# Patient Record
Sex: FEMALE | Race: WHITE
[De-identification: names, ages, dates, MRNs, and addresses within clinical notes are randomized per-mention and may not be internally consistent; named-entity substitution may affect disease eponyms.]

---

## 2021-11-10 ENCOUNTER — HOSPITAL ENCOUNTER (INPATIENT)
Dept: HOSPITAL 12 - ER | Age: 66
LOS: 8 days | Discharge: NURSING, REHAB OR LONG-TERM CARE | DRG: 871 | End: 2021-11-18
Payer: MEDICARE

## 2021-11-10 VITALS — HEIGHT: 58 IN | BODY MASS INDEX: 23.72 KG/M2 | WEIGHT: 113 LBS

## 2021-11-10 DIAGNOSIS — E86.1: ICD-10-CM

## 2021-11-10 DIAGNOSIS — R74.01: ICD-10-CM

## 2021-11-10 DIAGNOSIS — F43.22: ICD-10-CM

## 2021-11-10 DIAGNOSIS — N17.0: ICD-10-CM

## 2021-11-10 DIAGNOSIS — E87.2: ICD-10-CM

## 2021-11-10 DIAGNOSIS — A04.72: ICD-10-CM

## 2021-11-10 DIAGNOSIS — E88.09: ICD-10-CM

## 2021-11-10 DIAGNOSIS — E86.0: ICD-10-CM

## 2021-11-10 DIAGNOSIS — K92.2: ICD-10-CM

## 2021-11-10 DIAGNOSIS — Z20.822: ICD-10-CM

## 2021-11-10 DIAGNOSIS — E43: ICD-10-CM

## 2021-11-10 DIAGNOSIS — A41.9: Primary | ICD-10-CM

## 2021-11-10 DIAGNOSIS — E83.51: ICD-10-CM

## 2021-11-10 DIAGNOSIS — E87.1: ICD-10-CM

## 2021-11-10 LAB
ALP SERPL-CCNC: 72 U/L (ref 50–136)
ALT SERPL W/O P-5'-P-CCNC: 69 U/L (ref 14–59)
AST SERPL-CCNC: 53 U/L (ref 15–37)
BILIRUB DIRECT SERPL-MCNC: 0.2 MG/DL (ref 0–0.2)
BILIRUB SERPL-MCNC: 0.9 MG/DL (ref 0.2–1)
BUN SERPL-MCNC: 81 MG/DL (ref 7–18)
CHLORIDE SERPL-SCNC: 93 MMOL/L (ref 98–107)
CO2 SERPL-SCNC: 19 MMOL/L (ref 21–32)
CREAT SERPL-MCNC: 4.4 MG/DL (ref 0.6–1.3)
GLUCOSE SERPL-MCNC: 113 MG/DL (ref 74–106)
HCT VFR BLD AUTO: 39.8 % (ref 31.2–41.9)
HEMOCCULT STL QL: POSITIVE
LIPASE SERPL-CCNC: 40 U/L (ref 73–393)
MCH RBC QN AUTO: 29.8 UUG (ref 24.7–32.8)
MCV RBC AUTO: 88.3 FL (ref 75.5–95.3)
PLATELET # BLD AUTO: 215 K/UL (ref 179–408)
POTASSIUM SERPL-SCNC: 4.6 MMOL/L (ref 3.5–5.1)
WS STN SPEC: 5.3 G/DL (ref 6.4–8.2)

## 2021-11-10 PROCEDURE — A4663 DIALYSIS BLOOD PRESSURE CUFF: HCPCS

## 2021-11-10 PROCEDURE — A4217 STERILE WATER/SALINE, 500 ML: HCPCS

## 2021-11-10 PROCEDURE — G0378 HOSPITAL OBSERVATION PER HR: HCPCS

## 2021-11-10 PROCEDURE — C9113 INJ PANTOPRAZOLE SODIUM, VIA: HCPCS

## 2021-11-10 RX ADMIN — METRONIDAZOLE SCH MLS/HR: 500 SOLUTION INTRAVENOUS at 23:15

## 2021-11-10 RX ADMIN — DEXTROSE SCH MG: 50 INJECTION, SOLUTION INTRAVENOUS at 23:15

## 2021-11-10 RX ADMIN — LACTOBACILLUS ACIDOPH-L.BULGARICUS 1 MILLION CELL CHEWABLE TABLET SCH TAB.CHEW: at 23:28

## 2021-11-10 NOTE — NUR
-------------------------------------------------------------------------------

           *** Note jhony in EDM - 11/10/21 at 1459 by KATE ***           

-------------------------------------------------------------------------------

PATIENT CAME INTO THE ED FROM HOME WITH A C/C OF NAUSEA AND VOMITING FOR THE 
PAST 2 DAYS. PATIENT HAS NO KNOWN MEDICAL HISTORY. PATIENT SKIN LOOKS DRY UPON 
ASSESSMENT. PATIENT IS IN GOWN, ON MONITORS, AAOX4, NO VOMITING SINCE BEING 
ADMITTED TO THE ED, BUT REPORTS NAUSEA. 20 G ESTABLISHED IN HER RIGHT WRIST AND 
LINE IS PATENT. PENDING MD ORDERS.

## 2021-11-10 NOTE — NUR
PATIENT DISCHARGED HOME WIHT LEG BAG, AND TO FOLLOW UP WITH UROLOGIST IN THE 
NEXT COUPLE OF DAYS AND TO RETURN TO THE ED IF THE PROBLEM PERSISTS. PATIENT IS 
AMBULATORY WITH A STEADY GAIT, AAOX4, DENIES SHORTNESS OF BREATH, NAD NOTED, 
DENIES N/V/D. IV DC'D, ARMBAND REMOVED. PT IS BEING ACCOMPANIED BY DAUGHTER TO 
GO HOME WITH BELONGINGS.

## 2021-11-11 LAB
ALP SERPL-CCNC: 57 U/L (ref 50–136)
ALT SERPL W/O P-5'-P-CCNC: 51 U/L (ref 14–59)
AST SERPL-CCNC: 36 U/L (ref 15–37)
BILIRUB DIRECT SERPL-MCNC: 0.2 MG/DL (ref 0–0.2)
BILIRUB SERPL-MCNC: 0.8 MG/DL (ref 0.2–1)
BUN SERPL-MCNC: 88 MG/DL (ref 7–18)
CHLORIDE SERPL-SCNC: 99 MMOL/L (ref 98–107)
CO2 SERPL-SCNC: 18 MMOL/L (ref 21–32)
CREAT SERPL-MCNC: 4.4 MG/DL (ref 0.6–1.3)
GLUCOSE SERPL-MCNC: 87 MG/DL (ref 74–106)
HCT VFR BLD AUTO: 40.9 % (ref 31.2–41.9)
MAGNESIUM SERPL-MCNC: 2.8 MG/DL (ref 1.8–2.4)
MCH RBC QN AUTO: 29.8 UUG (ref 24.7–32.8)
MCV RBC AUTO: 89.4 FL (ref 75.5–95.3)
PHOSPHATE SERPL-MCNC: 6 MG/DL (ref 2.5–4.9)
PLATELET # BLD AUTO: 197 K/UL (ref 179–408)
POTASSIUM SERPL-SCNC: 4.8 MMOL/L (ref 3.5–5.1)
WS STN SPEC: 5 G/DL (ref 6.4–8.2)

## 2021-11-11 PROCEDURE — 05HB33Z INSERTION OF INFUSION DEVICE INTO RIGHT BASILIC VEIN, PERCUTANEOUS APPROACH: ICD-10-PCS

## 2021-11-11 RX ADMIN — METRONIDAZOLE SCH MLS/HR: 500 SOLUTION INTRAVENOUS at 14:05

## 2021-11-11 RX ADMIN — DEXTROSE SCH MG: 50 INJECTION, SOLUTION INTRAVENOUS at 23:30

## 2021-11-11 RX ADMIN — VANCOMYCIN HYDROCHLORIDE SCH MG: 500 INJECTION, POWDER, LYOPHILIZED, FOR SOLUTION INTRAVENOUS at 18:33

## 2021-11-11 RX ADMIN — DEXTROSE SCH MG: 50 INJECTION, SOLUTION INTRAVENOUS at 09:00

## 2021-11-11 RX ADMIN — LACTOBACILLUS ACIDOPH-L.BULGARICUS 1 MILLION CELL CHEWABLE TABLET SCH TAB.CHEW: at 23:20

## 2021-11-11 RX ADMIN — LACTOBACILLUS ACIDOPH-L.BULGARICUS 1 MILLION CELL CHEWABLE TABLET SCH TAB.CHEW: at 14:04

## 2021-11-11 RX ADMIN — LACTOBACILLUS ACIDOPH-L.BULGARICUS 1 MILLION CELL CHEWABLE TABLET SCH TAB.CHEW: at 06:00

## 2021-11-11 RX ADMIN — CALCIUM SCH MG: 500 TABLET ORAL at 09:02

## 2021-11-11 RX ADMIN — VANCOMYCIN HYDROCHLORIDE SCH MG: 500 INJECTION, POWDER, LYOPHILIZED, FOR SOLUTION INTRAVENOUS at 12:14

## 2021-11-11 RX ADMIN — METRONIDAZOLE SCH MLS/HR: 500 SOLUTION INTRAVENOUS at 06:00

## 2021-11-11 RX ADMIN — METRONIDAZOLE SCH MLS/HR: 500 SOLUTION INTRAVENOUS at 23:30

## 2021-11-11 NOTE — NUR
pt remained comfortable in bed the whole er stay. perineal hygine provided 
multiple time, the last one at 1700 with loose yellow stool. c-diff precautions 
implemented.

## 2021-11-12 VITALS — DIASTOLIC BLOOD PRESSURE: 61 MMHG | SYSTOLIC BLOOD PRESSURE: 137 MMHG

## 2021-11-12 VITALS — SYSTOLIC BLOOD PRESSURE: 112 MMHG | DIASTOLIC BLOOD PRESSURE: 41 MMHG

## 2021-11-12 VITALS — DIASTOLIC BLOOD PRESSURE: 59 MMHG | SYSTOLIC BLOOD PRESSURE: 112 MMHG

## 2021-11-12 VITALS — SYSTOLIC BLOOD PRESSURE: 115 MMHG | DIASTOLIC BLOOD PRESSURE: 54 MMHG

## 2021-11-12 LAB
BUN SERPL-MCNC: 100 MG/DL (ref 7–18)
CHLORIDE SERPL-SCNC: 102 MMOL/L (ref 98–107)
CO2 SERPL-SCNC: 16 MMOL/L (ref 21–32)
CREAT SERPL-MCNC: 4.1 MG/DL (ref 0.6–1.3)
GLUCOSE SERPL-MCNC: 85 MG/DL (ref 74–106)
HCT VFR BLD AUTO: 37.1 % (ref 31.2–41.9)
MCH RBC QN AUTO: 29.5 UUG (ref 24.7–32.8)
MCV RBC AUTO: 89 FL (ref 75.5–95.3)
PLATELET # BLD AUTO: 177 K/UL (ref 179–408)
POTASSIUM SERPL-SCNC: 4.7 MMOL/L (ref 3.5–5.1)

## 2021-11-12 RX ADMIN — METRONIDAZOLE SCH MLS/HR: 500 SOLUTION INTRAVENOUS at 21:03

## 2021-11-12 RX ADMIN — CALCIUM SCH MG: 500 TABLET ORAL at 09:16

## 2021-11-12 RX ADMIN — METRONIDAZOLE SCH MLS/HR: 500 SOLUTION INTRAVENOUS at 13:24

## 2021-11-12 RX ADMIN — LACTOBACILLUS ACIDOPH-L.BULGARICUS 1 MILLION CELL CHEWABLE TABLET SCH TAB.CHEW: at 21:03

## 2021-11-12 RX ADMIN — VANCOMYCIN HYDROCHLORIDE SCH MG: 500 INJECTION, POWDER, LYOPHILIZED, FOR SOLUTION INTRAVENOUS at 23:22

## 2021-11-12 RX ADMIN — PANTOPRAZOLE SODIUM SCH MG: 40 GRANULE, DELAYED RELEASE ORAL at 20:53

## 2021-11-12 RX ADMIN — LACTOBACILLUS ACIDOPH-L.BULGARICUS 1 MILLION CELL CHEWABLE TABLET SCH TAB.CHEW: at 06:39

## 2021-11-12 RX ADMIN — LACTOBACILLUS ACIDOPH-L.BULGARICUS 1 MILLION CELL CHEWABLE TABLET SCH TAB.CHEW: at 13:07

## 2021-11-12 RX ADMIN — METRONIDAZOLE SCH MLS/HR: 500 SOLUTION INTRAVENOUS at 06:39

## 2021-11-12 RX ADMIN — DEXTROSE PRN MLS/HR: 5 SOLUTION INTRAVENOUS at 23:19

## 2021-11-12 RX ADMIN — DEXTROSE SCH MG: 50 INJECTION, SOLUTION INTRAVENOUS at 09:16

## 2021-11-12 RX ADMIN — VANCOMYCIN HYDROCHLORIDE SCH MG: 500 INJECTION, POWDER, LYOPHILIZED, FOR SOLUTION INTRAVENOUS at 00:37

## 2021-11-12 RX ADMIN — VANCOMYCIN HYDROCHLORIDE SCH MG: 500 INJECTION, POWDER, LYOPHILIZED, FOR SOLUTION INTRAVENOUS at 06:26

## 2021-11-12 RX ADMIN — VANCOMYCIN HYDROCHLORIDE SCH MG: 500 INJECTION, POWDER, LYOPHILIZED, FOR SOLUTION INTRAVENOUS at 17:39

## 2021-11-12 RX ADMIN — DEXTROSE PRN MLS/HR: 5 SOLUTION INTRAVENOUS at 13:06

## 2021-11-12 RX ADMIN — VANCOMYCIN HYDROCHLORIDE SCH MG: 500 INJECTION, POWDER, LYOPHILIZED, FOR SOLUTION INTRAVENOUS at 13:24

## 2021-11-12 NOTE — NUR
Received pt resting in bed. AAO x4. No acute distress noted. Denies pain/ discomfort. Denies 
N/V. Pt still drinking Golytely for her colonoscopy tomorrow. NPO except meds after 
midnight. IRAJ midline, patent and intact. Safety measures maintained. Call light and 
personal items within reach. Will continue to monitor.

## 2021-11-13 VITALS — SYSTOLIC BLOOD PRESSURE: 121 MMHG | DIASTOLIC BLOOD PRESSURE: 59 MMHG

## 2021-11-13 VITALS — SYSTOLIC BLOOD PRESSURE: 116 MMHG | DIASTOLIC BLOOD PRESSURE: 60 MMHG

## 2021-11-13 VITALS — SYSTOLIC BLOOD PRESSURE: 132 MMHG | DIASTOLIC BLOOD PRESSURE: 65 MMHG

## 2021-11-13 VITALS — DIASTOLIC BLOOD PRESSURE: 64 MMHG | SYSTOLIC BLOOD PRESSURE: 125 MMHG

## 2021-11-13 LAB
BUN SERPL-MCNC: 97 MG/DL (ref 7–18)
CHLORIDE SERPL-SCNC: 106 MMOL/L (ref 98–107)
CO2 SERPL-SCNC: 17 MMOL/L (ref 21–32)
CREAT SERPL-MCNC: 3.3 MG/DL (ref 0.6–1.3)
GLUCOSE SERPL-MCNC: 68 MG/DL (ref 74–106)
HCT VFR BLD AUTO: 37.6 % (ref 31.2–41.9)
MCH RBC QN AUTO: 29.8 UUG (ref 24.7–32.8)
MCV RBC AUTO: 89.1 FL (ref 75.5–95.3)
PLATELET # BLD AUTO: 143 K/UL (ref 179–408)
POTASSIUM SERPL-SCNC: 4.2 MMOL/L (ref 3.5–5.1)

## 2021-11-13 PROCEDURE — 0DJD8ZZ INSPECTION OF LOWER INTESTINAL TRACT, VIA NATURAL OR ARTIFICIAL OPENING ENDOSCOPIC: ICD-10-PCS

## 2021-11-13 RX ADMIN — VANCOMYCIN HYDROCHLORIDE SCH MG: 500 INJECTION, POWDER, LYOPHILIZED, FOR SOLUTION INTRAVENOUS at 05:16

## 2021-11-13 RX ADMIN — LACTOBACILLUS ACIDOPH-L.BULGARICUS 1 MILLION CELL CHEWABLE TABLET SCH TAB.CHEW: at 22:13

## 2021-11-13 RX ADMIN — METRONIDAZOLE SCH MLS/HR: 500 SOLUTION INTRAVENOUS at 05:16

## 2021-11-13 RX ADMIN — METRONIDAZOLE SCH MLS/HR: 500 SOLUTION INTRAVENOUS at 15:21

## 2021-11-13 RX ADMIN — DEXTROSE PRN MLS/HR: 5 SOLUTION INTRAVENOUS at 17:22

## 2021-11-13 RX ADMIN — METRONIDAZOLE SCH MLS/HR: 500 SOLUTION INTRAVENOUS at 22:11

## 2021-11-13 RX ADMIN — PANTOPRAZOLE SODIUM SCH MG: 40 GRANULE, DELAYED RELEASE ORAL at 20:08

## 2021-11-13 RX ADMIN — VANCOMYCIN HYDROCHLORIDE SCH MG: 500 INJECTION, POWDER, LYOPHILIZED, FOR SOLUTION INTRAVENOUS at 11:36

## 2021-11-13 RX ADMIN — LACTOBACILLUS ACIDOPH-L.BULGARICUS 1 MILLION CELL CHEWABLE TABLET SCH TAB.CHEW: at 15:22

## 2021-11-13 RX ADMIN — CALCIUM SCH MG: 500 TABLET ORAL at 09:11

## 2021-11-13 RX ADMIN — PANTOPRAZOLE SODIUM SCH MG: 40 GRANULE, DELAYED RELEASE ORAL at 09:11

## 2021-11-13 RX ADMIN — VANCOMYCIN HYDROCHLORIDE SCH MG: 500 INJECTION, POWDER, LYOPHILIZED, FOR SOLUTION INTRAVENOUS at 17:36

## 2021-11-13 RX ADMIN — LACTOBACILLUS ACIDOPH-L.BULGARICUS 1 MILLION CELL CHEWABLE TABLET SCH TAB.CHEW: at 05:16

## 2021-11-13 NOTE — NUR
Patient is alert and oriented x 4, denies of any pain. on NPO except meds and patient is 
compliant, patient refused to finish GOLYTELY and MD is aware. Consent for Colonoscopy has 
been signed. Picked up by OR nurse for colonoscopy. IRAJ midline intact.

## 2021-11-13 NOTE — NUR
Notified [facility] that pt unable to finish Golytely, and NP stated that it is okay not to finish. 
Pt to be NPO.

## 2021-11-13 NOTE — NUR
Patient back to the unit from colonoscopy procedure, no new order, patient will be on clear 
liquid diet per Kasie QUINONES and will continue to monitor. Family at bedside and was able to 
speak to Kasie Young NP.

## 2021-11-14 VITALS — SYSTOLIC BLOOD PRESSURE: 120 MMHG | DIASTOLIC BLOOD PRESSURE: 66 MMHG

## 2021-11-14 VITALS — SYSTOLIC BLOOD PRESSURE: 113 MMHG | DIASTOLIC BLOOD PRESSURE: 52 MMHG

## 2021-11-14 VITALS — SYSTOLIC BLOOD PRESSURE: 117 MMHG | DIASTOLIC BLOOD PRESSURE: 52 MMHG

## 2021-11-14 LAB
BUN SERPL-MCNC: 81 MG/DL (ref 7–18)
CHLORIDE SERPL-SCNC: 110 MMOL/L (ref 98–107)
CO2 SERPL-SCNC: 21 MMOL/L (ref 21–32)
CREAT SERPL-MCNC: 2.2 MG/DL (ref 0.6–1.3)
GLUCOSE SERPL-MCNC: 123 MG/DL (ref 74–106)
HCT VFR BLD AUTO: 39 % (ref 31.2–41.9)
LYMPHOCYTES NFR BLD MANUAL: 15 % (ref 20–40)
MCH RBC QN AUTO: 29.7 UUG (ref 24.7–32.8)
MCV RBC AUTO: 88 FL (ref 75.5–95.3)
MONOCYTES NFR BLD MANUAL: 5 % (ref 2–10)
MYELOCYTES NFR BLD MANUAL: 1 % (ref 0–0)
NEUTS BAND NFR BLD MANUAL: 7 % (ref 0–10)
NEUTS SEG NFR BLD MANUAL: 72 % (ref 42–75)
PLATELET # BLD AUTO: 112 K/UL (ref 179–408)
POTASSIUM SERPL-SCNC: 3.7 MMOL/L (ref 3.5–5.1)

## 2021-11-14 RX ADMIN — VANCOMYCIN HYDROCHLORIDE SCH MG: 500 INJECTION, POWDER, LYOPHILIZED, FOR SOLUTION INTRAVENOUS at 17:59

## 2021-11-14 RX ADMIN — DEXTROSE PRN MLS/HR: 5 SOLUTION INTRAVENOUS at 03:55

## 2021-11-14 RX ADMIN — METRONIDAZOLE SCH MG: 500 TABLET ORAL at 22:26

## 2021-11-14 RX ADMIN — DEXTROSE AND SODIUM CHLORIDE PRN MLS/HR: 5; .45 INJECTION, SOLUTION INTRAVENOUS at 22:54

## 2021-11-14 RX ADMIN — LACTOBACILLUS ACIDOPH-L.BULGARICUS 1 MILLION CELL CHEWABLE TABLET SCH TAB.CHEW: at 13:03

## 2021-11-14 RX ADMIN — CALCIUM SCH MG: 500 TABLET ORAL at 09:13

## 2021-11-14 RX ADMIN — LACTOBACILLUS ACIDOPH-L.BULGARICUS 1 MILLION CELL CHEWABLE TABLET SCH TAB.CHEW: at 05:00

## 2021-11-14 RX ADMIN — METRONIDAZOLE SCH MLS/HR: 500 SOLUTION INTRAVENOUS at 05:00

## 2021-11-14 RX ADMIN — VANCOMYCIN HYDROCHLORIDE SCH MG: 500 INJECTION, POWDER, LYOPHILIZED, FOR SOLUTION INTRAVENOUS at 00:12

## 2021-11-14 RX ADMIN — PANTOPRAZOLE SODIUM SCH MG: 40 GRANULE, DELAYED RELEASE ORAL at 09:13

## 2021-11-14 RX ADMIN — DEXTROSE AND SODIUM CHLORIDE PRN MLS/HR: 5; .45 INJECTION, SOLUTION INTRAVENOUS at 12:02

## 2021-11-14 RX ADMIN — LACTOBACILLUS ACIDOPH-L.BULGARICUS 1 MILLION CELL CHEWABLE TABLET SCH TAB.CHEW: at 22:26

## 2021-11-14 RX ADMIN — PANTOPRAZOLE SODIUM SCH MG: 40 GRANULE, DELAYED RELEASE ORAL at 20:50

## 2021-11-14 RX ADMIN — Medication SCH ML: at 17:59

## 2021-11-14 RX ADMIN — ALPRAZOLAM PRN MG: 0.25 TABLET ORAL at 20:50

## 2021-11-14 RX ADMIN — METRONIDAZOLE SCH MG: 500 TABLET ORAL at 13:03

## 2021-11-14 RX ADMIN — VANCOMYCIN HYDROCHLORIDE SCH MG: 500 INJECTION, POWDER, LYOPHILIZED, FOR SOLUTION INTRAVENOUS at 12:02

## 2021-11-14 RX ADMIN — VANCOMYCIN HYDROCHLORIDE SCH MG: 500 INJECTION, POWDER, LYOPHILIZED, FOR SOLUTION INTRAVENOUS at 05:00

## 2021-11-14 NOTE — NUR
pt is a little anxious about her condition and wants to talk to psychiatrist. informed np 
keh with order for psych consult and xanax 0.25mg q6 prn noted and carried out. patient made 
aware.

## 2021-11-14 NOTE — NUR
RECEIVED PT AWAKE, ALERT AND ORIENTEDX4. PT IN NO ACUTE DISTRESS. IV INTACT. SAFETY AND 
COMFORT PROVIDED. WILL CONTINUE TO MONITOR. PT FAMILY AT BEDSIDE. TALKED WITH FAMILY MEMBER 
REGARDING UPDATE WITH THEIR MOM.

## 2021-11-14 NOTE — NUR
pt awake in bed, family visiting at this time. no resp distress. denies pain or sob. 
provided calm and quiet environment. needs attended to. on flagyl and voncocin po. no 
adverse/allergic rxn noted. denies nausea or vomiting. fc intact draining yellow urine. 
safety measures in place. needs attended to. cont to monitor.

## 2021-11-14 NOTE — NUR
PT GIVEN XANAX AT 2050H FOR ANXIETY AND PT REQUESTED FOR IT. AFTER AN HOUR PT STATED SHE 
FELT BETTER. PT STABLE. WILL CONTINUE TO MONITOR.

## 2021-11-15 VITALS — SYSTOLIC BLOOD PRESSURE: 110 MMHG | DIASTOLIC BLOOD PRESSURE: 47 MMHG

## 2021-11-15 VITALS — DIASTOLIC BLOOD PRESSURE: 69 MMHG | SYSTOLIC BLOOD PRESSURE: 118 MMHG

## 2021-11-15 VITALS — DIASTOLIC BLOOD PRESSURE: 63 MMHG | SYSTOLIC BLOOD PRESSURE: 106 MMHG

## 2021-11-15 VITALS — SYSTOLIC BLOOD PRESSURE: 114 MMHG | DIASTOLIC BLOOD PRESSURE: 61 MMHG

## 2021-11-15 LAB
BUN SERPL-MCNC: 54 MG/DL (ref 7–18)
CHLORIDE SERPL-SCNC: 106 MMOL/L (ref 98–107)
CO2 SERPL-SCNC: 22 MMOL/L (ref 21–32)
CREAT SERPL-MCNC: 1.7 MG/DL (ref 0.6–1.3)
GLUCOSE SERPL-MCNC: 174 MG/DL (ref 74–106)
HCT VFR BLD AUTO: 41.3 % (ref 31.2–41.9)
MCH RBC QN AUTO: 29.2 UUG (ref 24.7–32.8)
MCV RBC AUTO: 86.9 FL (ref 75.5–95.3)
PLATELET # BLD AUTO: 99 K/UL (ref 179–408)
POTASSIUM SERPL-SCNC: 3.3 MMOL/L (ref 3.5–5.1)

## 2021-11-15 RX ADMIN — VANCOMYCIN HYDROCHLORIDE SCH MG: 500 INJECTION, POWDER, LYOPHILIZED, FOR SOLUTION INTRAVENOUS at 01:01

## 2021-11-15 RX ADMIN — DEXTROSE AND SODIUM CHLORIDE PRN MLS/HR: 5; .45 INJECTION, SOLUTION INTRAVENOUS at 10:12

## 2021-11-15 RX ADMIN — Medication SCH ML: at 10:47

## 2021-11-15 RX ADMIN — Medication PRN MG: at 20:25

## 2021-11-15 RX ADMIN — LACTOBACILLUS ACIDOPH-L.BULGARICUS 1 MILLION CELL CHEWABLE TABLET SCH TAB.CHEW: at 21:15

## 2021-11-15 RX ADMIN — VANCOMYCIN HYDROCHLORIDE SCH MG: 500 INJECTION, POWDER, LYOPHILIZED, FOR SOLUTION INTRAVENOUS at 13:00

## 2021-11-15 RX ADMIN — METRONIDAZOLE SCH MG: 500 TABLET ORAL at 05:37

## 2021-11-15 RX ADMIN — PANTOPRAZOLE SODIUM SCH MG: 40 GRANULE, DELAYED RELEASE ORAL at 10:46

## 2021-11-15 RX ADMIN — VANCOMYCIN HYDROCHLORIDE SCH MG: 500 INJECTION, POWDER, LYOPHILIZED, FOR SOLUTION INTRAVENOUS at 05:39

## 2021-11-15 RX ADMIN — METRONIDAZOLE SCH MG: 500 TABLET ORAL at 21:15

## 2021-11-15 RX ADMIN — Medication SCH ML: at 17:05

## 2021-11-15 RX ADMIN — VANCOMYCIN HYDROCHLORIDE SCH MG: 500 INJECTION, POWDER, LYOPHILIZED, FOR SOLUTION INTRAVENOUS at 23:09

## 2021-11-15 RX ADMIN — PANTOPRAZOLE SODIUM SCH MG: 40 GRANULE, DELAYED RELEASE ORAL at 20:54

## 2021-11-15 RX ADMIN — ALPRAZOLAM PRN MG: 0.25 TABLET ORAL at 23:08

## 2021-11-15 RX ADMIN — FIDAXOMICIN SCH MG: 200 TABLET, FILM COATED ORAL at 21:15

## 2021-11-15 RX ADMIN — LACTOBACILLUS ACIDOPH-L.BULGARICUS 1 MILLION CELL CHEWABLE TABLET SCH TAB.CHEW: at 05:37

## 2021-11-15 RX ADMIN — ACETAMINOPHEN PRN MG: 325 TABLET ORAL at 05:37

## 2021-11-15 RX ADMIN — Medication PRN MG: at 08:44

## 2021-11-15 RX ADMIN — LACTOBACILLUS ACIDOPH-L.BULGARICUS 1 MILLION CELL CHEWABLE TABLET SCH TAB.CHEW: at 13:01

## 2021-11-15 RX ADMIN — METRONIDAZOLE SCH MG: 500 TABLET ORAL at 13:00

## 2021-11-15 RX ADMIN — CALCIUM SCH MG: 500 TABLET ORAL at 10:46

## 2021-11-15 RX ADMIN — ACETAMINOPHEN PRN MG: 325 TABLET ORAL at 20:25

## 2021-11-15 RX ADMIN — VANCOMYCIN HYDROCHLORIDE SCH MG: 500 INJECTION, POWDER, LYOPHILIZED, FOR SOLUTION INTRAVENOUS at 17:05

## 2021-11-15 NOTE — NUR
received awake watching tv. no resp distress. denies pain or sob. moore intact draining 
yellow urine. safety measures in place. cont to monitor.

## 2021-11-15 NOTE — NUR
resting in bed watching tv. had a good visit with friend this afternoon. denies pain or sob. 
no acute distress. safety measures kept. cont to monitor. Electrodesiccation And Curettage Text: The wound bed was treated with electrodesiccation and curettage after the biopsy was performed.

## 2021-11-15 NOTE — NUR
PT SLEPT INTERMITTENTLTY. PT IN NO ACUTE DISTRESS. PRESCRIBED MEDICATION GIVEN AND PT 
TOLERATED IT WELL.  PT GIVEN TYLENOL PRN AT 0537H. IV INTACT. SAFETY AND COMFORT PROVIDED. 
WILL ENDORSE TO INCOMING NURSE FOR CONTINUITY OF CARE.

## 2021-11-16 VITALS — SYSTOLIC BLOOD PRESSURE: 116 MMHG | DIASTOLIC BLOOD PRESSURE: 60 MMHG

## 2021-11-16 VITALS — DIASTOLIC BLOOD PRESSURE: 62 MMHG | SYSTOLIC BLOOD PRESSURE: 119 MMHG

## 2021-11-16 VITALS — DIASTOLIC BLOOD PRESSURE: 49 MMHG | SYSTOLIC BLOOD PRESSURE: 109 MMHG

## 2021-11-16 VITALS — SYSTOLIC BLOOD PRESSURE: 115 MMHG | DIASTOLIC BLOOD PRESSURE: 50 MMHG

## 2021-11-16 LAB
BUN SERPL-MCNC: 39 MG/DL (ref 7–18)
CHLORIDE SERPL-SCNC: 105 MMOL/L (ref 98–107)
CO2 SERPL-SCNC: 25 MMOL/L (ref 21–32)
CREAT SERPL-MCNC: 1.4 MG/DL (ref 0.6–1.3)
GLUCOSE SERPL-MCNC: 114 MG/DL (ref 74–106)
HCT VFR BLD AUTO: 43.5 % (ref 31.2–41.9)
MCH RBC QN AUTO: 29.7 UUG (ref 24.7–32.8)
MCV RBC AUTO: 87.8 FL (ref 75.5–95.3)
PLATELET # BLD AUTO: 124 K/UL (ref 179–408)
POTASSIUM SERPL-SCNC: 4.1 MMOL/L (ref 3.5–5.1)

## 2021-11-16 RX ADMIN — Medication SCH ML: at 11:30

## 2021-11-16 RX ADMIN — LACTOBACILLUS ACIDOPH-L.BULGARICUS 1 MILLION CELL CHEWABLE TABLET SCH TAB.CHEW: at 06:04

## 2021-11-16 RX ADMIN — FIDAXOMICIN SCH MG: 200 TABLET, FILM COATED ORAL at 10:13

## 2021-11-16 RX ADMIN — LACTOBACILLUS ACIDOPH-L.BULGARICUS 1 MILLION CELL CHEWABLE TABLET SCH TAB.CHEW: at 21:39

## 2021-11-16 RX ADMIN — VANCOMYCIN HYDROCHLORIDE SCH MG: 500 INJECTION, POWDER, LYOPHILIZED, FOR SOLUTION INTRAVENOUS at 12:39

## 2021-11-16 RX ADMIN — PANTOPRAZOLE SODIUM SCH MG: 40 GRANULE, DELAYED RELEASE ORAL at 20:23

## 2021-11-16 RX ADMIN — CALCIUM SCH MG: 500 TABLET ORAL at 10:13

## 2021-11-16 RX ADMIN — LACTOBACILLUS ACIDOPH-L.BULGARICUS 1 MILLION CELL CHEWABLE TABLET SCH TAB.CHEW: at 13:09

## 2021-11-16 RX ADMIN — DEXTROSE AND SODIUM CHLORIDE PRN MLS/HR: 5; .45 INJECTION, SOLUTION INTRAVENOUS at 21:38

## 2021-11-16 RX ADMIN — PANTOPRAZOLE SODIUM SCH MG: 40 GRANULE, DELAYED RELEASE ORAL at 10:37

## 2021-11-16 RX ADMIN — METRONIDAZOLE SCH MG: 500 TABLET ORAL at 06:04

## 2021-11-16 RX ADMIN — Medication SCH ML: at 17:34

## 2021-11-16 RX ADMIN — ALPRAZOLAM PRN MG: 0.25 TABLET ORAL at 22:01

## 2021-11-16 RX ADMIN — METRONIDAZOLE SCH MG: 500 TABLET ORAL at 13:09

## 2021-11-16 RX ADMIN — VANCOMYCIN HYDROCHLORIDE SCH MG: 500 INJECTION, POWDER, LYOPHILIZED, FOR SOLUTION INTRAVENOUS at 06:04

## 2021-11-16 RX ADMIN — METRONIDAZOLE SCH MG: 500 TABLET ORAL at 21:39

## 2021-11-16 RX ADMIN — DEXTROSE AND SODIUM CHLORIDE PRN MLS/HR: 5; .45 INJECTION, SOLUTION INTRAVENOUS at 11:51

## 2021-11-16 RX ADMIN — OYSTER SHELL CALCIUM WITH VITAMIN D SCH UDTAB: 500; 200 TABLET, FILM COATED ORAL at 10:13

## 2021-11-16 RX ADMIN — VANCOMYCIN HYDROCHLORIDE SCH MG: 500 INJECTION, POWDER, LYOPHILIZED, FOR SOLUTION INTRAVENOUS at 17:33

## 2021-11-16 RX ADMIN — FIDAXOMICIN SCH MG: 200 TABLET, FILM COATED ORAL at 20:23

## 2021-11-16 NOTE — NUR
Received patient lying in bed watching tv. AOX4. No acute distress noted at this time. IV 
midline on Right UA running D5 1/2 NS running at 100cc/hr. Walter catheter draining yellow 
colored urine. All due medications given and tolerated well. Safety and comfort measures 
initiated. Bed alarm activated, call light button and bedside table within reach. Will 
continue to monitor.

## 2021-11-16 NOTE — NUR
REMAIN ON CONTACT ISOLATION AND PRECAUTION HAS MULTIPLE DIARRHEA EPISODES ASSISTED WITH 
CLEAN WAS ABLE TO AMB ULATE WITH THE PHYSICAL THERAPIST WITH [facility] IVF REMAINS IN 
PROGRESS AS ORDERED VIA MIDLINE RIGH5T UPPER ARM WITH NO S/S OF IN FILTRATION AT THIS TIME

## 2021-11-17 VITALS — DIASTOLIC BLOOD PRESSURE: 60 MMHG | SYSTOLIC BLOOD PRESSURE: 113 MMHG

## 2021-11-17 VITALS — DIASTOLIC BLOOD PRESSURE: 59 MMHG | SYSTOLIC BLOOD PRESSURE: 117 MMHG

## 2021-11-17 VITALS — SYSTOLIC BLOOD PRESSURE: 107 MMHG | DIASTOLIC BLOOD PRESSURE: 68 MMHG

## 2021-11-17 VITALS — SYSTOLIC BLOOD PRESSURE: 115 MMHG | DIASTOLIC BLOOD PRESSURE: 73 MMHG

## 2021-11-17 LAB
BUN SERPL-MCNC: 27 MG/DL (ref 7–18)
CHLORIDE SERPL-SCNC: 103 MMOL/L (ref 98–107)
CO2 SERPL-SCNC: 25 MMOL/L (ref 21–32)
CREAT SERPL-MCNC: 1.3 MG/DL (ref 0.6–1.3)
GLUCOSE SERPL-MCNC: 126 MG/DL (ref 74–106)
HCT VFR BLD AUTO: 41.3 % (ref 31.2–41.9)
MAGNESIUM SERPL-MCNC: 1.6 MG/DL (ref 1.8–2.4)
MCH RBC QN AUTO: 29.6 UUG (ref 24.7–32.8)
MCV RBC AUTO: 88.4 FL (ref 75.5–95.3)
PLATELET # BLD AUTO: 161 K/UL (ref 179–408)
POTASSIUM SERPL-SCNC: 3.9 MMOL/L (ref 3.5–5.1)

## 2021-11-17 RX ADMIN — PANTOPRAZOLE SODIUM SCH MG: 40 GRANULE, DELAYED RELEASE ORAL at 20:43

## 2021-11-17 RX ADMIN — MAGNESIUM SULFATE IN DEXTROSE SCH MLS/HR: 10 INJECTION, SOLUTION INTRAVENOUS at 10:31

## 2021-11-17 RX ADMIN — LACTOBACILLUS ACIDOPH-L.BULGARICUS 1 MILLION CELL CHEWABLE TABLET SCH TAB.CHEW: at 05:01

## 2021-11-17 RX ADMIN — Medication SCH ML: at 13:21

## 2021-11-17 RX ADMIN — PANTOPRAZOLE SODIUM SCH MG: 40 GRANULE, DELAYED RELEASE ORAL at 09:20

## 2021-11-17 RX ADMIN — LACTOBACILLUS ACIDOPH-L.BULGARICUS 1 MILLION CELL CHEWABLE TABLET SCH TAB.CHEW: at 21:44

## 2021-11-17 RX ADMIN — VANCOMYCIN HYDROCHLORIDE SCH MG: 500 INJECTION, POWDER, LYOPHILIZED, FOR SOLUTION INTRAVENOUS at 17:29

## 2021-11-17 RX ADMIN — LACTOBACILLUS ACIDOPH-L.BULGARICUS 1 MILLION CELL CHEWABLE TABLET SCH TAB.CHEW: at 13:21

## 2021-11-17 RX ADMIN — VANCOMYCIN HYDROCHLORIDE SCH MG: 500 INJECTION, POWDER, LYOPHILIZED, FOR SOLUTION INTRAVENOUS at 23:49

## 2021-11-17 RX ADMIN — OYSTER SHELL CALCIUM WITH VITAMIN D SCH UDTAB: 500; 200 TABLET, FILM COATED ORAL at 09:18

## 2021-11-17 RX ADMIN — Medication SCH ML: at 09:23

## 2021-11-17 RX ADMIN — Medication SCH ML: at 17:38

## 2021-11-17 RX ADMIN — Medication ONE ML: at 17:00

## 2021-11-17 RX ADMIN — VANCOMYCIN HYDROCHLORIDE SCH MG: 500 INJECTION, POWDER, LYOPHILIZED, FOR SOLUTION INTRAVENOUS at 00:10

## 2021-11-17 RX ADMIN — CALCIUM SCH MG: 500 TABLET ORAL at 09:18

## 2021-11-17 RX ADMIN — VANCOMYCIN HYDROCHLORIDE SCH MG: 500 INJECTION, POWDER, LYOPHILIZED, FOR SOLUTION INTRAVENOUS at 05:01

## 2021-11-17 RX ADMIN — METRONIDAZOLE SCH MG: 500 TABLET ORAL at 05:01

## 2021-11-17 RX ADMIN — METRONIDAZOLE SCH MG: 500 TABLET ORAL at 21:44

## 2021-11-17 RX ADMIN — METRONIDAZOLE SCH MG: 500 TABLET ORAL at 13:21

## 2021-11-17 RX ADMIN — VANCOMYCIN HYDROCHLORIDE SCH MG: 500 INJECTION, POWDER, LYOPHILIZED, FOR SOLUTION INTRAVENOUS at 12:39

## 2021-11-17 RX ADMIN — MAGNESIUM SULFATE IN DEXTROSE SCH MLS/HR: 10 INJECTION, SOLUTION INTRAVENOUS at 12:39

## 2021-11-17 RX ADMIN — Medication ONE ML: at 18:18

## 2021-11-17 RX ADMIN — FIDAXOMICIN SCH MG: 200 TABLET, FILM COATED ORAL at 09:50

## 2021-11-17 RX ADMIN — ALPRAZOLAM PRN MG: 0.25 TABLET ORAL at 22:20

## 2021-11-17 RX ADMIN — FIDAXOMICIN SCH MG: 200 TABLET, FILM COATED ORAL at 20:43

## 2021-11-17 RX ADMIN — DEXTROSE AND SODIUM CHLORIDE PRN MLS/HR: 5; .45 INJECTION, SOLUTION INTRAVENOUS at 07:28

## 2021-11-17 NOTE — NUR
OFFERED PATIENT DYCICLOMINE AS ORDERED FOR ABDOMINAL SPASMS A PRN ORDER STATED DOES NOT NEED 
IT NOW WILL LET ME KNOW WHEN SHE NEEDS IT

## 2021-11-17 NOTE — NUR
MAGNESSIUM LEVEL IS 1.5 SEEN BY DANI REICH NP WITH ORDERS AND NOTED DANI REICH ALSO WANTS 
PATIENT MEEKS CATH DISCONTINUED AND NOTED.

## 2021-11-17 NOTE — NUR
PATIENT HAS AN ORDER FOR DISCHARGE TODAY BUT PATIENT AND HER DAUGHTER IS NOT AGREEING TO BE 
DISCHARGED TODAY AND THEY SPOKE WITH DANI REICH AND THE PLAN IS THAT THE FAMILY WILL GO AND 
LOOK AT [facility] AND WILL THEN MAKE A DECISION WEATHER PATIENT WILL BE GOING 
HOME OR TO SNF IN AM.

## 2021-11-17 NOTE — NUR
Patient slept intermittently through the night. Patient reports abdominal pain, however, 
refuses to request for pain medication and resorted to non-pharmacological techniques 
instead.  IV on right U.A still running D5 1/2 NS at 100cc/hr. Walter catheter draining 
yellow colored urine. All due medications were given and tolerated well. Patient transferred 
to Room 320. Will endorse to incoming nurse.

## 2021-11-17 NOTE — NUR
SHE IS EATING SOUP AND FOOD BROUGHT IN BY HER FAMILY AT THE BEDSIDE WITH NO NAUSEA OR 
VOMITING AT THIS TIME.

## 2021-11-17 NOTE — NUR
CALL RECEIVED FROM  MICHEL STATED THAT PATIENTS FAMILY HAS AGREED FOR PATIENT TO 
BE DISCHARGED TO [facility] TOMORROW WILL BE PICKED UP ABOUT 1100 AM BY 
[facility]

## 2021-11-18 VITALS — DIASTOLIC BLOOD PRESSURE: 73 MMHG | SYSTOLIC BLOOD PRESSURE: 115 MMHG

## 2021-11-18 VITALS — SYSTOLIC BLOOD PRESSURE: 121 MMHG | DIASTOLIC BLOOD PRESSURE: 75 MMHG

## 2021-11-18 LAB
BUN SERPL-MCNC: 22 MG/DL (ref 7–18)
CHLORIDE SERPL-SCNC: 102 MMOL/L (ref 98–107)
CO2 SERPL-SCNC: 28 MMOL/L (ref 21–32)
CREAT SERPL-MCNC: 1.1 MG/DL (ref 0.6–1.3)
GLUCOSE SERPL-MCNC: 102 MG/DL (ref 74–106)
HCT VFR BLD AUTO: 43.4 % (ref 31.2–41.9)
MCH RBC QN AUTO: 29.2 UUG (ref 24.7–32.8)
MCV RBC AUTO: 87.7 FL (ref 75.5–95.3)
PLATELET # BLD AUTO: 258 K/UL (ref 179–408)
POTASSIUM SERPL-SCNC: 4.4 MMOL/L (ref 3.5–5.1)

## 2021-11-18 RX ADMIN — LACTOBACILLUS ACIDOPH-L.BULGARICUS 1 MILLION CELL CHEWABLE TABLET SCH TAB.CHEW: at 06:08

## 2021-11-18 RX ADMIN — Medication SCH ML: at 08:54

## 2021-11-18 RX ADMIN — FIDAXOMICIN SCH MG: 200 TABLET, FILM COATED ORAL at 08:53

## 2021-11-18 RX ADMIN — VANCOMYCIN HYDROCHLORIDE SCH MG: 500 INJECTION, POWDER, LYOPHILIZED, FOR SOLUTION INTRAVENOUS at 06:08

## 2021-11-18 RX ADMIN — OYSTER SHELL CALCIUM WITH VITAMIN D SCH UDTAB: 500; 200 TABLET, FILM COATED ORAL at 08:53

## 2021-11-18 RX ADMIN — PANTOPRAZOLE SODIUM SCH MG: 40 GRANULE, DELAYED RELEASE ORAL at 08:53

## 2021-11-18 RX ADMIN — METRONIDAZOLE SCH MG: 500 TABLET ORAL at 06:00

## 2021-11-18 RX ADMIN — CALCIUM SCH MG: 500 TABLET ORAL at 08:54

## 2021-11-18 NOTE — NUR
APA emts in unit, report given to emt all questions answered. patient left with v/s wnl, on 
contact precautions d/t c-diff and midline to right upper arm per md order.

## 2021-11-18 NOTE — NUR
CALLED [facility] AND SPOKE WITH RAUL ALONSO, REPORT WAS GIVEN ALL QUESTIONS ANSWERED. ALL 
PERTINENT INFORMATION GIVEN.

## 2021-11-18 NOTE — NUR
DR Adams informed about the patient bm with blood noted.with no further orders, will 
continue to monitor.vital signs checked and recorded

## 2021-11-18 NOTE — NUR
dicharge instructions given to patient, patient states understanding. inventory list 
verified with patient. all paperwork signed. patient reminded to follow MD instructions 
"Please continue Dificid 200mg q12h, last dose 11/25/21. Please continue vancomycin 12/1/21. 
Please follow up with Gi specialist within 2 weeks for 

re-evaluation. may need prolonged vancomycin course depending on resolution of symptoms. 
Continue to hydrate very well and supplement with gatorade or other electrolyte drink such 
as pediatelyte. Start with BRAT diet and move to bland diet and advance as tolerated. Please 
return to emergency department immediately if any new or worsening/concerning symptoms, 
unable to eat or drink." patient states understanding. explained that [facility] ambulance will 
 patient and take her to [facility].

## 2021-11-28 ENCOUNTER — HOSPITAL ENCOUNTER (INPATIENT)
Dept: HOSPITAL 12 - ER | Age: 66
LOS: 4 days | Discharge: HOME HEALTH | DRG: 871 | End: 2021-12-02
Attending: INTERNAL MEDICINE | Admitting: INTERNAL MEDICINE
Payer: MEDICARE

## 2021-11-28 VITALS — HEIGHT: 58 IN | WEIGHT: 110 LBS | BODY MASS INDEX: 23.09 KG/M2

## 2021-11-28 DIAGNOSIS — A04.72: ICD-10-CM

## 2021-11-28 DIAGNOSIS — J90: ICD-10-CM

## 2021-11-28 DIAGNOSIS — R62.7: ICD-10-CM

## 2021-11-28 DIAGNOSIS — D68.59: ICD-10-CM

## 2021-11-28 DIAGNOSIS — A04.9: ICD-10-CM

## 2021-11-28 DIAGNOSIS — E43: ICD-10-CM

## 2021-11-28 DIAGNOSIS — K82.9: ICD-10-CM

## 2021-11-28 DIAGNOSIS — A41.9: Primary | ICD-10-CM

## 2021-11-28 DIAGNOSIS — E87.6: ICD-10-CM

## 2021-11-28 DIAGNOSIS — K62.89: ICD-10-CM

## 2021-11-28 DIAGNOSIS — K31.89: ICD-10-CM

## 2021-11-28 DIAGNOSIS — D64.9: ICD-10-CM

## 2021-11-28 DIAGNOSIS — Z82.49: ICD-10-CM

## 2021-11-28 DIAGNOSIS — B48.8: ICD-10-CM

## 2021-11-28 DIAGNOSIS — R18.8: ICD-10-CM

## 2021-11-28 DIAGNOSIS — D16.9: ICD-10-CM

## 2021-11-28 LAB
ALP SERPL-CCNC: 218 U/L (ref 50–136)
ALT SERPL W/O P-5'-P-CCNC: 25 U/L (ref 14–59)
AST SERPL-CCNC: 15 U/L (ref 15–37)
BILIRUB DIRECT SERPL-MCNC: 0.2 MG/DL (ref 0–0.2)
BILIRUB SERPL-MCNC: 0.5 MG/DL (ref 0.2–1)
BUN SERPL-MCNC: 8 MG/DL (ref 7–18)
CHLORIDE SERPL-SCNC: 102 MMOL/L (ref 98–107)
CO2 SERPL-SCNC: 31 MMOL/L (ref 21–32)
CREAT SERPL-MCNC: 0.8 MG/DL (ref 0.6–1.3)
GLUCOSE SERPL-MCNC: 88 MG/DL (ref 74–106)
HCT VFR BLD AUTO: 27 % (ref 31.2–41.9)
MCH RBC QN AUTO: 29.4 UUG (ref 24.7–32.8)
MCV RBC AUTO: 87 FL (ref 75.5–95.3)
PLATELET # BLD AUTO: 276 K/UL (ref 179–408)
POTASSIUM SERPL-SCNC: 2.6 MMOL/L (ref 3.5–5.1)
WS STN SPEC: 4.2 G/DL (ref 6.4–8.2)

## 2021-11-28 PROCEDURE — G0378 HOSPITAL OBSERVATION PER HR: HCPCS

## 2021-11-28 PROCEDURE — A6209 FOAM DRSG <=16 SQ IN W/O BDR: HCPCS

## 2021-11-28 PROCEDURE — A4663 DIALYSIS BLOOD PRESSURE CUFF: HCPCS

## 2021-11-28 PROCEDURE — A4217 STERILE WATER/SALINE, 500 ML: HCPCS

## 2021-11-28 NOTE — NUR
Pt refusing lab draw. States having had blood drawn before leaving [facility]. Called and spoke with Anabel, labs were drawn but not yet resulted. Will 
fax us results as soon as they become available.

## 2021-11-29 VITALS — DIASTOLIC BLOOD PRESSURE: 54 MMHG | SYSTOLIC BLOOD PRESSURE: 110 MMHG

## 2021-11-29 VITALS — DIASTOLIC BLOOD PRESSURE: 59 MMHG | SYSTOLIC BLOOD PRESSURE: 111 MMHG

## 2021-11-29 VITALS — DIASTOLIC BLOOD PRESSURE: 65 MMHG | SYSTOLIC BLOOD PRESSURE: 121 MMHG

## 2021-11-29 VITALS — SYSTOLIC BLOOD PRESSURE: 115 MMHG | DIASTOLIC BLOOD PRESSURE: 66 MMHG

## 2021-11-29 VITALS — DIASTOLIC BLOOD PRESSURE: 67 MMHG | SYSTOLIC BLOOD PRESSURE: 117 MMHG

## 2021-11-29 VITALS — SYSTOLIC BLOOD PRESSURE: 112 MMHG | DIASTOLIC BLOOD PRESSURE: 57 MMHG

## 2021-11-29 LAB
ALP SERPL-CCNC: 241 U/L (ref 50–136)
ALT SERPL W/O P-5'-P-CCNC: 33 U/L (ref 14–59)
APPEARANCE UR: (no result)
AST SERPL-CCNC: 14 U/L (ref 15–37)
BILIRUB SERPL-MCNC: 0.4 MG/DL (ref 0.2–1)
BILIRUB UR QL STRIP: NEGATIVE
BUN SERPL-MCNC: 9 MG/DL (ref 7–18)
CHLORIDE SERPL-SCNC: 103 MMOL/L (ref 98–107)
CHOLEST SERPL-MCNC: 137 MG/DL (ref ?–200)
CO2 SERPL-SCNC: 30 MMOL/L (ref 21–32)
COLOR UR: YELLOW
CREAT SERPL-MCNC: 0.9 MG/DL (ref 0.6–1.3)
DEPRECATED SQUAMOUS URNS QL MICRO: (no result) /HPF
GLUCOSE SERPL-MCNC: 158 MG/DL (ref 74–106)
GLUCOSE UR STRIP-MCNC: NEGATIVE MG/DL
HCT VFR BLD AUTO: 30.8 % (ref 31.2–41.9)
HDLC SERPL-MCNC: 33 MG/DL (ref 40–60)
HGB UR QL STRIP: (no result)
IRON SERPL-MCNC: 36 UG/DL (ref 50–175)
KETONES UR STRIP-MCNC: NEGATIVE MG/DL
LEUKOCYTE ESTERASE UR QL STRIP: NEGATIVE
MAGNESIUM SERPL-MCNC: 1.7 MG/DL (ref 1.8–2.4)
MCH RBC QN AUTO: 29.5 UUG (ref 24.7–32.8)
MCV RBC AUTO: 87.8 FL (ref 75.5–95.3)
NITRITE UR QL STRIP: NEGATIVE
PH UR STRIP: 6 [PH] (ref 5–8)
PHOSPHATE SERPL-MCNC: 3.6 MG/DL (ref 2.5–4.9)
PLATELET # BLD AUTO: 371 K/UL (ref 179–408)
POTASSIUM SERPL-SCNC: 3.7 MMOL/L (ref 3.5–5.1)
RBC #/AREA URNS HPF: (no result) /HPF (ref 0–3)
SP GR UR STRIP: 1.02 (ref 1–1.03)
TRIGL SERPL-MCNC: 124 MG/DL (ref 30–150)
TSH SERPL DL<=0.005 MIU/L-ACNC: 1.41 MIU/ML (ref 0.36–3.74)
UROBILINOGEN UR STRIP-MCNC: 0.2 E.U./DL
WBC #/AREA URNS HPF: (no result) /HPF
WBC #/AREA URNS HPF: (no result) /HPF (ref 0–3)
WS STN SPEC: 4.9 G/DL (ref 6.4–8.2)
YEAST URNS QL MICRO: (no result) /HPF

## 2021-11-29 RX ADMIN — MAGNESIUM SULFATE IN DEXTROSE SCH MLS/HR: 10 INJECTION, SOLUTION INTRAVENOUS at 09:56

## 2021-11-29 RX ADMIN — VANCOMYCIN HYDROCHLORIDE SCH MG: 500 INJECTION, POWDER, LYOPHILIZED, FOR SOLUTION INTRAVENOUS at 23:06

## 2021-11-29 RX ADMIN — CALCIUM CARBONATE (ANTACID) CHEW TAB 500 MG SCH MG: 500 CHEW TAB at 09:17

## 2021-11-29 RX ADMIN — DEXTROSE AND SODIUM CHLORIDE PRN MLS/HR: 5; .45 INJECTION, SOLUTION INTRAVENOUS at 18:28

## 2021-11-29 RX ADMIN — VANCOMYCIN HYDROCHLORIDE SCH ML: 500 INJECTION, POWDER, LYOPHILIZED, FOR SOLUTION INTRAVENOUS at 21:36

## 2021-11-29 RX ADMIN — ANORECTAL OINTMENT SCH APPLIC: 15.7; .44; 24; 20.6 OINTMENT TOPICAL at 13:49

## 2021-11-29 RX ADMIN — Medication SCH EACH: at 21:35

## 2021-11-29 RX ADMIN — TEMAZEPAM PRN MG: 15 CAPSULE ORAL at 22:33

## 2021-11-29 RX ADMIN — LIDOCAINE SCH PATCH: 50 PATCH CUTANEOUS at 13:53

## 2021-11-29 RX ADMIN — MAGNESIUM SULFATE IN DEXTROSE SCH MLS/HR: 10 INJECTION, SOLUTION INTRAVENOUS at 11:15

## 2021-11-29 RX ADMIN — ANORECTAL OINTMENT SCH APPLIC: 15.7; .44; 24; 20.6 OINTMENT TOPICAL at 20:33

## 2021-11-29 RX ADMIN — VANCOMYCIN HYDROCHLORIDE SCH MG: 500 INJECTION, POWDER, LYOPHILIZED, FOR SOLUTION INTRAVENOUS at 13:49

## 2021-11-29 RX ADMIN — Medication SCH EACH: at 05:18

## 2021-11-29 RX ADMIN — DEXTROSE AND SODIUM CHLORIDE PRN MLS/HR: 5; .45 INJECTION, SOLUTION INTRAVENOUS at 02:00

## 2021-11-29 RX ADMIN — PANTOPRAZOLE SODIUM SCH MG: 40 TABLET, DELAYED RELEASE ORAL at 09:16

## 2021-11-29 RX ADMIN — VANCOMYCIN HYDROCHLORIDE SCH MG: 500 INJECTION, POWDER, LYOPHILIZED, FOR SOLUTION INTRAVENOUS at 18:29

## 2021-11-29 RX ADMIN — FIDAXOMICIN SCH MG: 200 TABLET, FILM COATED ORAL at 17:05

## 2021-11-29 RX ADMIN — OYSTER SHELL CALCIUM WITH VITAMIN D SCH UDTAB: 500; 200 TABLET, FILM COATED ORAL at 13:48

## 2021-11-29 RX ADMIN — VANCOMYCIN HYDROCHLORIDE SCH ML: 500 INJECTION, POWDER, LYOPHILIZED, FOR SOLUTION INTRAVENOUS at 15:00

## 2021-11-29 RX ADMIN — Medication SCH EACH: at 13:49

## 2021-11-29 NOTE — NUR
Arrived via gurney, AO x 4, IV intact and patent, tolerating antibiotics well, on room air 
saturating at 96%, able to help reposition extremities, admission routine completed. No 
signs of acute distress. Call lights within reach, safety measures initiated.

## 2021-11-29 NOTE — NUR
no distress noted, personal caregiver at bedside, had 6 diarrheal stools this shift, stool 
for c diff sent this morning, all needs attended and met, enteric precautions observed, call 
light within reach

## 2021-11-29 NOTE — NUR
AO x 4, on room air, IV intact and patent, running IV hydration and antibiotics, no signs of 
acute distress noted. Call lights within reach, safety measures initiated.

## 2021-11-29 NOTE — NUR
awake alert/oriented, denies of pain, no nausea, on full liquid, explained plan of 
care-verbalized understanding, safety measures maintained, call light within reach

## 2021-11-29 NOTE — NUR
AO x 4, on room air O2 saturating at 97%, slept throughout the night, able to state needs 
and have been met, compliant with medication and care, no signs of acute distress noted, 
denies any discomfort and pain. Call lights within reach, safety measures maintained, 
belongings by bedside within reach. Will endorse to am shift

## 2021-11-30 VITALS — SYSTOLIC BLOOD PRESSURE: 117 MMHG | DIASTOLIC BLOOD PRESSURE: 62 MMHG

## 2021-11-30 VITALS — DIASTOLIC BLOOD PRESSURE: 64 MMHG | SYSTOLIC BLOOD PRESSURE: 114 MMHG

## 2021-11-30 VITALS — SYSTOLIC BLOOD PRESSURE: 118 MMHG | DIASTOLIC BLOOD PRESSURE: 63 MMHG

## 2021-11-30 VITALS — SYSTOLIC BLOOD PRESSURE: 110 MMHG | DIASTOLIC BLOOD PRESSURE: 56 MMHG

## 2021-11-30 LAB
BUN SERPL-MCNC: 8 MG/DL (ref 7–18)
CHLORIDE SERPL-SCNC: 103 MMOL/L (ref 98–107)
CO2 SERPL-SCNC: 29 MMOL/L (ref 21–32)
CREAT SERPL-MCNC: 0.8 MG/DL (ref 0.6–1.3)
GLUCOSE SERPL-MCNC: 95 MG/DL (ref 74–106)
HCT VFR BLD AUTO: 27.2 % (ref 31.2–41.9)
MAGNESIUM SERPL-MCNC: 2.1 MG/DL (ref 1.8–2.4)
MCH RBC QN AUTO: 29.8 UUG (ref 24.7–32.8)
MCV RBC AUTO: 87.4 FL (ref 75.5–95.3)
PHOSPHATE SERPL-MCNC: 2.5 MG/DL (ref 2.5–4.9)
PLATELET # BLD AUTO: 394 K/UL (ref 179–408)
POTASSIUM SERPL-SCNC: 2.8 MMOL/L (ref 3.5–5.1)

## 2021-11-30 RX ADMIN — VANCOMYCIN HYDROCHLORIDE SCH MG: 500 INJECTION, POWDER, LYOPHILIZED, FOR SOLUTION INTRAVENOUS at 12:29

## 2021-11-30 RX ADMIN — VANCOMYCIN HYDROCHLORIDE SCH MG: 500 INJECTION, POWDER, LYOPHILIZED, FOR SOLUTION INTRAVENOUS at 23:45

## 2021-11-30 RX ADMIN — PANTOPRAZOLE SODIUM SCH MG: 40 TABLET, DELAYED RELEASE ORAL at 09:17

## 2021-11-30 RX ADMIN — DEXTROSE AND SODIUM CHLORIDE PRN MLS/HR: 5; .45 INJECTION, SOLUTION INTRAVENOUS at 23:51

## 2021-11-30 RX ADMIN — VANCOMYCIN HYDROCHLORIDE SCH ML: 500 INJECTION, POWDER, LYOPHILIZED, FOR SOLUTION INTRAVENOUS at 22:18

## 2021-11-30 RX ADMIN — VANCOMYCIN HYDROCHLORIDE SCH MG: 500 INJECTION, POWDER, LYOPHILIZED, FOR SOLUTION INTRAVENOUS at 05:28

## 2021-11-30 RX ADMIN — FIDAXOMICIN SCH MG: 200 TABLET, FILM COATED ORAL at 20:12

## 2021-11-30 RX ADMIN — ANORECTAL OINTMENT SCH APPLIC: 15.7; .44; 24; 20.6 OINTMENT TOPICAL at 09:23

## 2021-11-30 RX ADMIN — Medication SCH EACH: at 14:45

## 2021-11-30 RX ADMIN — ANORECTAL OINTMENT SCH APPLIC: 15.7; .44; 24; 20.6 OINTMENT TOPICAL at 20:12

## 2021-11-30 RX ADMIN — LIDOCAINE SCH PATCH: 50 PATCH CUTANEOUS at 14:00

## 2021-11-30 RX ADMIN — Medication SCH EACH: at 05:28

## 2021-11-30 RX ADMIN — FIDAXOMICIN SCH MG: 200 TABLET, FILM COATED ORAL at 09:18

## 2021-11-30 RX ADMIN — TEMAZEPAM PRN MG: 15 CAPSULE ORAL at 23:45

## 2021-11-30 RX ADMIN — VANCOMYCIN HYDROCHLORIDE SCH MG: 500 INJECTION, POWDER, LYOPHILIZED, FOR SOLUTION INTRAVENOUS at 17:41

## 2021-11-30 RX ADMIN — CALCIUM CARBONATE (ANTACID) CHEW TAB 500 MG SCH MG: 500 CHEW TAB at 09:18

## 2021-11-30 RX ADMIN — VANCOMYCIN HYDROCHLORIDE SCH ML: 500 INJECTION, POWDER, LYOPHILIZED, FOR SOLUTION INTRAVENOUS at 09:31

## 2021-11-30 RX ADMIN — VANCOMYCIN HYDROCHLORIDE SCH ML: 500 INJECTION, POWDER, LYOPHILIZED, FOR SOLUTION INTRAVENOUS at 05:31

## 2021-11-30 RX ADMIN — Medication SCH EACH: at 22:18

## 2021-11-30 RX ADMIN — OYSTER SHELL CALCIUM WITH VITAMIN D SCH UDTAB: 500; 200 TABLET, FILM COATED ORAL at 12:30

## 2021-11-30 RX ADMIN — DEXTROSE AND SODIUM CHLORIDE PRN MLS/HR: 5; .45 INJECTION, SOLUTION INTRAVENOUS at 10:07

## 2021-11-30 NOTE — NUR
vancomycin retention enema given- tolerated well, washed and cleansed area after and applied 
Z guard ointment, neds attended by nurses and personal caregiver

## 2021-11-30 NOTE — NUR
Slept intermittently, AO x 4, on room air saturating at 95%, IV intact and patent, running 
IV hydration, no signs of acute distress. Patient refuses 0600 Vancomycin retention enema. 
Requests to have it at 0800. Will endorse to am shift. Call lights within reach, safety 
measures maintained.

## 2021-11-30 NOTE — NUR
awake alert and oriented, denies of pain, no nuasea, still has diarrhea but not as frequent 
as before, states requested 0600 vancomycin retention enema given later- informed will give 
at 1000, explaiined plan fo care- verbalized understanding

## 2021-12-01 VITALS — SYSTOLIC BLOOD PRESSURE: 128 MMHG | DIASTOLIC BLOOD PRESSURE: 64 MMHG

## 2021-12-01 VITALS — SYSTOLIC BLOOD PRESSURE: 126 MMHG | DIASTOLIC BLOOD PRESSURE: 56 MMHG

## 2021-12-01 VITALS — SYSTOLIC BLOOD PRESSURE: 122 MMHG | DIASTOLIC BLOOD PRESSURE: 59 MMHG

## 2021-12-01 VITALS — DIASTOLIC BLOOD PRESSURE: 63 MMHG | SYSTOLIC BLOOD PRESSURE: 116 MMHG

## 2021-12-01 RX ADMIN — CALCIUM CARBONATE (ANTACID) CHEW TAB 500 MG SCH MG: 500 CHEW TAB at 09:15

## 2021-12-01 RX ADMIN — FIDAXOMICIN SCH MG: 200 TABLET, FILM COATED ORAL at 20:24

## 2021-12-01 RX ADMIN — VANCOMYCIN HYDROCHLORIDE SCH ML: 500 INJECTION, POWDER, LYOPHILIZED, FOR SOLUTION INTRAVENOUS at 14:32

## 2021-12-01 RX ADMIN — Medication SCH EACH: at 22:18

## 2021-12-01 RX ADMIN — Medication SCH EACH: at 05:47

## 2021-12-01 RX ADMIN — PANTOPRAZOLE SODIUM SCH MG: 40 TABLET, DELAYED RELEASE ORAL at 09:15

## 2021-12-01 RX ADMIN — TEMAZEPAM PRN MG: 15 CAPSULE ORAL at 23:17

## 2021-12-01 RX ADMIN — VANCOMYCIN HYDROCHLORIDE SCH MG: 500 INJECTION, POWDER, LYOPHILIZED, FOR SOLUTION INTRAVENOUS at 23:17

## 2021-12-01 RX ADMIN — VANCOMYCIN HYDROCHLORIDE SCH MG: 500 INJECTION, POWDER, LYOPHILIZED, FOR SOLUTION INTRAVENOUS at 12:29

## 2021-12-01 RX ADMIN — VANCOMYCIN HYDROCHLORIDE SCH MG: 500 INJECTION, POWDER, LYOPHILIZED, FOR SOLUTION INTRAVENOUS at 05:46

## 2021-12-01 RX ADMIN — ANORECTAL OINTMENT SCH APPLIC: 15.7; .44; 24; 20.6 OINTMENT TOPICAL at 20:23

## 2021-12-01 RX ADMIN — VANCOMYCIN HYDROCHLORIDE SCH ML: 500 INJECTION, POWDER, LYOPHILIZED, FOR SOLUTION INTRAVENOUS at 22:18

## 2021-12-01 RX ADMIN — VANCOMYCIN HYDROCHLORIDE SCH ML: 500 INJECTION, POWDER, LYOPHILIZED, FOR SOLUTION INTRAVENOUS at 05:47

## 2021-12-01 RX ADMIN — DEXTROSE AND SODIUM CHLORIDE PRN MLS/HR: 5; .45 INJECTION, SOLUTION INTRAVENOUS at 14:01

## 2021-12-01 RX ADMIN — ANORECTAL OINTMENT SCH APPLIC: 15.7; .44; 24; 20.6 OINTMENT TOPICAL at 09:16

## 2021-12-01 RX ADMIN — VANCOMYCIN HYDROCHLORIDE SCH MG: 500 INJECTION, POWDER, LYOPHILIZED, FOR SOLUTION INTRAVENOUS at 18:07

## 2021-12-01 RX ADMIN — Medication SCH EACH: at 14:31

## 2021-12-01 RX ADMIN — LIDOCAINE SCH PATCH: 50 PATCH CUTANEOUS at 14:31

## 2021-12-01 RX ADMIN — OYSTER SHELL CALCIUM WITH VITAMIN D SCH UDTAB: 500; 200 TABLET, FILM COATED ORAL at 14:01

## 2021-12-01 RX ADMIN — FIDAXOMICIN SCH MG: 200 TABLET, FILM COATED ORAL at 09:15

## 2021-12-01 NOTE — NUR
Pt slept throughout the night. Denies SOB or chest pain. Vanco enema done, patient tolerated 
well. Pt able to make needs known. IV site intact infusing ordered fluids. Safety and 
comfort provided. No other issues or concerns at this time.

## 2021-12-02 VITALS — DIASTOLIC BLOOD PRESSURE: 71 MMHG | SYSTOLIC BLOOD PRESSURE: 125 MMHG

## 2021-12-02 VITALS — SYSTOLIC BLOOD PRESSURE: 60 MMHG

## 2021-12-02 VITALS — DIASTOLIC BLOOD PRESSURE: 58 MMHG | SYSTOLIC BLOOD PRESSURE: 119 MMHG

## 2021-12-02 LAB
ALP SERPL-CCNC: 174 U/L (ref 50–136)
ALT SERPL W/O P-5'-P-CCNC: 25 U/L (ref 14–59)
AST SERPL-CCNC: 17 U/L (ref 15–37)
BILIRUB SERPL-MCNC: 0.4 MG/DL (ref 0.2–1)
BUN SERPL-MCNC: 6 MG/DL (ref 7–18)
CHLORIDE SERPL-SCNC: 108 MMOL/L (ref 98–107)
CO2 SERPL-SCNC: 29 MMOL/L (ref 21–32)
CREAT SERPL-MCNC: 0.8 MG/DL (ref 0.6–1.3)
GLUCOSE SERPL-MCNC: 89 MG/DL (ref 74–106)
HCT VFR BLD AUTO: 27 % (ref 31.2–41.9)
MAGNESIUM SERPL-MCNC: 1.6 MG/DL (ref 1.8–2.4)
MCH RBC QN AUTO: 29.7 UUG (ref 24.7–32.8)
MCV RBC AUTO: 87.7 FL (ref 75.5–95.3)
PHOSPHATE SERPL-MCNC: 2.8 MG/DL (ref 2.5–4.9)
PLATELET # BLD AUTO: 513 K/UL (ref 179–408)
POTASSIUM SERPL-SCNC: 2.6 MMOL/L (ref 3.5–5.1)
WS STN SPEC: 4.5 G/DL (ref 6.4–8.2)

## 2021-12-02 RX ADMIN — CALCIUM CARBONATE (ANTACID) CHEW TAB 500 MG SCH MG: 500 CHEW TAB at 08:12

## 2021-12-02 RX ADMIN — DEXTROSE AND SODIUM CHLORIDE PRN MLS/HR: 5; .45 INJECTION, SOLUTION INTRAVENOUS at 06:21

## 2021-12-02 RX ADMIN — PANTOPRAZOLE SODIUM SCH MG: 40 TABLET, DELAYED RELEASE ORAL at 08:12

## 2021-12-02 RX ADMIN — OYSTER SHELL CALCIUM WITH VITAMIN D SCH UDTAB: 500; 200 TABLET, FILM COATED ORAL at 12:12

## 2021-12-02 RX ADMIN — ANORECTAL OINTMENT SCH APPLIC: 15.7; .44; 24; 20.6 OINTMENT TOPICAL at 08:12

## 2021-12-02 RX ADMIN — VANCOMYCIN HYDROCHLORIDE SCH ML: 500 INJECTION, POWDER, LYOPHILIZED, FOR SOLUTION INTRAVENOUS at 14:00

## 2021-12-02 RX ADMIN — Medication SCH EACH: at 05:33

## 2021-12-02 RX ADMIN — VANCOMYCIN HYDROCHLORIDE SCH MG: 500 INJECTION, POWDER, LYOPHILIZED, FOR SOLUTION INTRAVENOUS at 05:33

## 2021-12-02 RX ADMIN — VANCOMYCIN HYDROCHLORIDE SCH MG: 500 INJECTION, POWDER, LYOPHILIZED, FOR SOLUTION INTRAVENOUS at 12:12

## 2021-12-02 RX ADMIN — VANCOMYCIN HYDROCHLORIDE SCH ML: 500 INJECTION, POWDER, LYOPHILIZED, FOR SOLUTION INTRAVENOUS at 05:33

## 2021-12-02 RX ADMIN — FIDAXOMICIN SCH MG: 200 TABLET, FILM COATED ORAL at 08:45

## 2021-12-02 NOTE — NUR
dc orders received noted and carried out,dc heplock per md orders.dc instruction given to 
the pt and her daughter pt left the facility via private car in stable condition

## 2021-12-02 NOTE — NUR
Pt slept intermittently throughout the night. Denies chest pain or SOB. Episodes of diarrhea 
throughout shift. Vanco enema given to pt as ordered, tolerated well. Safety and comfort 
provided. No other issues or concerns at this time, will endorse to day shift.